# Patient Record
Sex: FEMALE | Employment: OTHER | URBAN - METROPOLITAN AREA
[De-identification: names, ages, dates, MRNs, and addresses within clinical notes are randomized per-mention and may not be internally consistent; named-entity substitution may affect disease eponyms.]

---

## 2022-12-22 ENCOUNTER — TELEPHONE (OUTPATIENT)
Dept: OBGYN | Facility: CLINIC | Age: 37
End: 2022-12-22

## 2022-12-22 NOTE — TELEPHONE ENCOUNTER
[facility] Call Center    Phone Message    May a detailed message be left on voicemail: yes     Reason for Call: Other: .  New pt is doing JCARLOS, pt is 39 weeks and has OB appt sched with Dilan Jan 2nd. Pt states her due date is 12/28 and is requesting to be seen sooner in case she goes into labor and wants to know if she'd be able to [facility] birth [facility]. Please call pt      Action Taken: Message routed to:  Other: [facility]    Travel Screening: Not Applicable

## 2022-12-23 NOTE — TELEPHONE ENCOUNTER
Attempted to call patient regarding her JCARLOS and appointment on 12/28. Reached voicemail and left message with clinic's phone number.